# Patient Record
Sex: FEMALE | Race: BLACK OR AFRICAN AMERICAN | NOT HISPANIC OR LATINO | URBAN - METROPOLITAN AREA
[De-identification: names, ages, dates, MRNs, and addresses within clinical notes are randomized per-mention and may not be internally consistent; named-entity substitution may affect disease eponyms.]

---

## 2019-09-26 ENCOUNTER — APPOINTMENT (OUTPATIENT)
Age: 39
Setting detail: DERMATOLOGY
End: 2019-09-27

## 2019-09-26 VITALS — HEIGHT: 62 IN

## 2019-09-26 DIAGNOSIS — L30.9 DERMATITIS, UNSPECIFIED: ICD-10-CM

## 2019-09-26 PROBLEM — I10 ESSENTIAL (PRIMARY) HYPERTENSION: Status: ACTIVE | Noted: 2019-09-26

## 2019-09-26 PROCEDURE — 99202 OFFICE O/P NEW SF 15 MIN: CPT

## 2019-09-26 PROCEDURE — OTHER COUNSELING: OTHER

## 2019-09-26 PROCEDURE — OTHER TREATMENT REGIMEN: OTHER

## 2019-09-26 PROCEDURE — OTHER MIPS QUALITY: OTHER

## 2019-09-26 PROCEDURE — OTHER FOLLOW UP FOR NEXT VISIT: OTHER

## 2019-09-26 ASSESSMENT — LOCATION ZONE DERM: LOCATION ZONE: FACE

## 2019-09-26 ASSESSMENT — LOCATION SIMPLE DESCRIPTION DERM
LOCATION SIMPLE: RIGHT CHEEK
LOCATION SIMPLE: LEFT CHEEK

## 2019-09-26 ASSESSMENT — LOCATION DETAILED DESCRIPTION DERM
LOCATION DETAILED: LEFT MEDIAL BUCCAL CHEEK
LOCATION DETAILED: RIGHT MEDIAL BUCCAL CHEEK

## 2019-09-26 ASSESSMENT — BSA RASH: BSA RASH: 1

## 2019-09-26 ASSESSMENT — PAIN INTENSITY VAS: HOW INTENSE IS YOUR PAIN 0 BEING NO PAIN, 10 BEING THE MOST SEVERE PAIN POSSIBLE?: NO PAIN

## 2019-09-26 ASSESSMENT — SEVERITY ASSESSMENT: SEVERITY: MILD

## 2019-09-26 NOTE — PROCEDURE: TREATMENT REGIMEN
Samples Given: Eucrisa x2, Ge[employer] info
Otc Regimen: Cerave healing ointment
Detail Level: Simple
Plan: Consistent with eczema or chelitis. Likely due to wet dry phenomenon at night. Patient will start using the Cerave healing ointment at night and during the day. She can continue using the desonide lotion as needed. She will try the samples of the Eucrisa once daily at night we did  her regarding the possibility of some burning on initiation. If this is working she will call for a prescription. I did tell the patient this is likely to be long-term management versus cure but we will see how she does on the new regimen
Continue Regimen: Desonide lotion prn

## 2023-05-04 NOTE — PROCEDURE: FOLLOW UP FOR NEXT VISIT
Report received from Denia WATSON RN    
Detail Level: Zone
Scheduled For Follow Up In (Optional): 1 month